# Patient Record
Sex: MALE | URBAN - METROPOLITAN AREA
[De-identification: names, ages, dates, MRNs, and addresses within clinical notes are randomized per-mention and may not be internally consistent; named-entity substitution may affect disease eponyms.]

---

## 2024-10-03 ENCOUNTER — TELEPHONE (OUTPATIENT)
Age: 89
End: 2024-10-03

## 2024-10-03 NOTE — TELEPHONE ENCOUNTER
New Patient    What is the reason for the patient’s appointment?: NP calling to schedule appt for penile implant. Pt requested to see Dr Carvajal and scheduled for next available on 11/12 @ 130pm.     What office location does the patient prefer?: [place]     Does patient have Imaging/Lab Results: no    Have patient records been requested?:  If No, are the records showing in Epic:       INSURANCE:   Do we accept the patient's insurance or is the patient Self-Pay?:    Insurance Provider: GREY ARMSTRONG   Plan Type/Number:   Member ID#: [id number]

## 2024-11-12 ENCOUNTER — OFFICE VISIT (OUTPATIENT)
Dept: UROLOGY | Facility: MEDICAL CENTER | Age: 89
End: 2024-11-12
Payer: COMMERCIAL

## 2024-11-12 VITALS
BODY MASS INDEX: 26.17 KG/M2 | WEIGHT: 193.2 LBS | SYSTOLIC BLOOD PRESSURE: 148 MMHG | HEIGHT: 72 IN | HEART RATE: 63 BPM | OXYGEN SATURATION: 97 % | DIASTOLIC BLOOD PRESSURE: 88 MMHG

## 2024-11-12 DIAGNOSIS — N52.31 ERECTILE DYSFUNCTION AFTER RADICAL PROSTATECTOMY: ICD-10-CM

## 2024-11-12 DIAGNOSIS — Z85.46 HISTORY OF PROSTATE CANCER: Primary | ICD-10-CM

## 2024-11-12 DIAGNOSIS — Z90.79 HISTORY OF RADICAL PROSTATECTOMY: ICD-10-CM

## 2024-11-12 PROCEDURE — 99203 OFFICE O/P NEW LOW 30 MIN: CPT | Performed by: UROLOGY

## 2024-11-12 RX ORDER — TADALAFIL 20 MG/1
20 TABLET ORAL DAILY PRN
Qty: 5 TABLET | Refills: 0 | Status: SHIPPED | OUTPATIENT
Start: 2024-11-12

## 2024-11-12 RX ORDER — OMEPRAZOLE 20 MG/1
20 TABLET, DELAYED RELEASE ORAL DAILY
COMMUNITY

## 2024-11-12 RX ORDER — SILDENAFIL 100 MG/1
100 TABLET, FILM COATED ORAL
COMMUNITY
Start: 2024-08-26 | End: 2024-11-12

## 2024-11-12 RX ORDER — CETIRIZINE HYDROCHLORIDE 10 MG/1
10 TABLET ORAL DAILY
COMMUNITY

## 2024-11-12 RX ORDER — DIPHENOXYLATE HYDROCHLORIDE AND ATROPINE SULFATE 2.5; .025 MG/1; MG/1
1 TABLET ORAL DAILY
COMMUNITY

## 2024-11-12 RX ORDER — NAPROXEN 500 MG/1
1000 TABLET ORAL AS NEEDED
COMMUNITY

## 2024-11-12 RX ORDER — LANOLIN ALCOHOL/MO/W.PET/CERES
CREAM (GRAM) TOPICAL DAILY
COMMUNITY

## 2024-11-12 NOTE — PROGRESS NOTES
Ambulatory Visit  Name: Curt Garcia      : 1935      MRN: [id number]  Encounter Provider: Constantine Carvajal MD  Encounter Date: 2024   Encounter department: [facility] [place]    Assessment & Plan  History of prostate cancer  1995-radical prostatectomy for prostate cancer with undetectable PSA since.  No longer getting regular PSA testing.  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  Patient will try Cialis 20 mg with prescription for 5 tablets prescribed.  If this does not work he will fill prescription for alprostadil and return for injection therapy.  Patient is interested in IPP and even though he is 89 years of age he looks wonderful has a very high performance status and 2 years ago was easily cleared by cardiology for hernia repair  Orders:    tadalafil (CIALIS) 20 MG tablet; Take 1 tablet (20 mg total) by mouth daily as needed for erectile dysfunction    Alprostadil, Vasodilator, (PROSTAGLANDIN PGE1 INJECTABLE 20 MCG/ML, STANDARD, IJ SOLN); 1 mL by Intracavernosal route once for 1 dose    History of radical prostatectomy           History of Present Illness     Curt Garcia is a 89 y.o. male who presents with a remote history of radical prostatectomy for prostate cancer.  The patient has had no evidence of disease recurrence being operated on in the [facility] by Dr. Montgomery.  The patient's erectile dysfunction has been managed with sildenafil 100 mg however at this time he does not get enough erectile rigidity for penetration and he is unable to last as well.  The patient presents for treatment interested in IPP.  The patient has an excellent performance status and otherwise is doing well healthwise.    History obtained from : patient  Review of Systems   All other systems reviewed and are negative.    Pertinent Medical History           Medical History Reviewed by  provider this encounter:  Tobacco  Allergies  Meds  Problems  Med Hx  Surg Hx  Fam Hx  Soc   Hx      Past Medical History   History reviewed. No pertinent past medical history.  Past Surgical History:   Procedure Laterality Date    INGUINAL HERNIA REPAIR      PROSTATECTOMY       Family History   Problem Relation Age of Onset    Kidney failure Mother     Emphysema Father     Colon cancer Sister     Heart failure Sister     No Known Problems Sister          in automobile accident    Alcohol abuse Brother      Current Outpatient Medications on File Prior to Visit   Medication Sig Dispense Refill    ascorbic acid (VITAMIN C) 1000 MG tablet Take 1,000 mg by mouth daily      Brinzolamide-Brimonidine 1-0.2 % SUSP Apply 1 drop to eye 2 (two) times a day      cetirizine (ZyrTEC) 10 mg tablet Take 10 mg by mouth daily      GARLIC PO Take 1 capsule by mouth daily      multivitamin (THERAGRAN) TABS Take 1 tablet by mouth daily      naproxen (EC NAPROSYN) 500 MG EC tablet Take 1,000 mg by mouth if needed for mild pain      omeprazole (PriLOSEC OTC) 20 MG tablet Take 20 mg by mouth daily      vitamin B-12 (VITAMIN B-12) 1,000 mcg tablet Take by mouth daily      [DISCONTINUED] sildenafil (VIAGRA) 100 mg tablet Take 100 mg by mouth       No current facility-administered medications on file prior to visit.   No Known Allergies   Current Outpatient Medications on File Prior to Visit   Medication Sig Dispense Refill    ascorbic acid (VITAMIN C) 1000 MG tablet Take 1,000 mg by mouth daily      Brinzolamide-Brimonidine 1-0.2 % SUSP Apply 1 drop to eye 2 (two) times a day      cetirizine (ZyrTEC) 10 mg tablet Take 10 mg by mouth daily      GARLIC PO Take 1 capsule by mouth daily      multivitamin (THERAGRAN) TABS Take 1 tablet by mouth daily      naproxen (EC NAPROSYN) 500 MG EC tablet Take 1,000 mg by mouth if needed for mild pain      omeprazole (PriLOSEC OTC) 20 MG tablet Take 20 mg by mouth daily      vitamin B-12  (VITAMIN B-12) 1,000 mcg tablet Take by mouth daily      [DISCONTINUED] sildenafil (VIAGRA) 100 mg tablet Take 100 mg by mouth       No current facility-administered medications on file prior to visit.      Social History     Tobacco Use    Smoking status: Former     Current packs/day: 0.00     Types: Cigarettes     Quit date:      Years since quittin.9    Smokeless tobacco: Never   Vaping Use    Vaping status: Never Used   Substance and Sexual Activity    Alcohol use: Yes     Alcohol/week: 2.0 standard drinks of alcohol     Types: 2 Glasses of wine per week    Drug use: Not Currently    Sexual activity: Yes         AUA SYMPTOM SCORE      Flowsheet Row Most Recent Value   AUA SYMPTOM SCORE    How often have you had a sensation of not emptying your bladder completely after you finished urinating? 0 (P)     How often have you had to urinate again less than two hours after you finished urinating? 0 (P)     How often have you found you stopped and started again several times when you urinate? 0 (P)     How often have you found it difficult to postpone urination? 0 (P)     How often have you had a weak urinary stream? 0 (P)     How often have you had to push or strain to begin urination? 0 (P)     How many times did you most typically get up to urinate from the time you went to bed at night until the time you got up in the morning? 1 (P)     Quality of Life: If you were to spend the rest of your life with your urinary condition just the way it is now, how would you feel about that? 1 (P)     AUA SYMPTOM SCORE 1 (P)            Objective     /88 (BP Location: Left arm, Patient Position: Sitting, Cuff Size: Adult)   Pulse 63   Ht 6' (1.829 m)   Wt 87.6 kg (193 lb 3.2 oz)   SpO2 97%   BMI 26.20 kg/m²   Physical Exam  Vitals reviewed.   Constitutional:       General: He is not in acute distress.     Appearance: Normal appearance. He is normal weight. He is not ill-appearing, toxic-appearing or diaphoretic.  "  HENT:      Head: Normocephalic and atraumatic.      Nose: Nose normal.      Mouth/Throat:      Mouth: Mucous membranes are moist.   Eyes:      Extraocular Movements: Extraocular movements intact.   Pulmonary:      Effort: Pulmonary effort is normal. No respiratory distress.      Breath sounds: No wheezing, rhonchi or rales.   Abdominal:      General: There is no distension.      Palpations: Abdomen is soft.   Genitourinary:     Penis: Normal.       Testes: Normal.   Musculoskeletal:         General: Normal range of motion.      Cervical back: Neck supple.   Skin:     General: Skin is dry.   Neurological:      Mental Status: He is alert and oriented to person, place, and time.   Psychiatric:         Mood and Affect: Mood normal.         Behavior: Behavior normal.         Thought Content: Thought content normal.         Judgment: Judgment normal.       Results  No results found for: \"PSA\"  Lab Results   Component Value Date    CALCIUM 9.3 06/13/2024    K 4.2 06/13/2024    CO2 26 06/13/2024    BUN 21 06/13/2024    CREATININE 1.14 06/13/2024     No results found for: \"WBC\", \"HGB\", \"HCT\", \"MCV\", \"PLT\"    Office Urine Dip  No results found for this or any previous visit (from the past 1 hour(s)).]    Administrative Statements         "

## 2024-11-12 NOTE — LETTER
2024     Osman Daniels DO  [address]  [address]  [place] [address]    Patient: Curt Garcia   YOB: 1935   Date of Visit: 2024       Dear Dr. Daniels:    Thank you for referring Curt Garcia to me for evaluation. Below are my notes for this consultation.    If you have questions, please do not hesitate to call me. I look forward to following your patient along with you.         Sincerely,        Constantine Carvajal MD        CC: No Recipients    Constantine Carvajal MD  2024  2:27 PM  Sign when Signing Visit  Ambulatory Visit  Name: Curt Garcia      : 1935      MRN: [id number]  Encounter Provider: Constantine Carvajal MD  Encounter Date: 2024   Encounter department: [facility] [place]    Assessment & Plan  History of prostate cancer  1995-radical prostatectomy for prostate cancer with undetectable PSA since.  No longer getting regular PSA testing.  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  Patient will try Cialis 20 mg with prescription for 5 tablets prescribed.  If this does not work he will fill prescription for alprostadil and return for injection therapy.  Patient is interested in IPP and even though he is 89 years of age he looks wonderful has a very high performance status and 2 years ago was easily cleared by cardiology for hernia repair  Orders:  •  tadalafil (CIALIS) 20 MG tablet; Take 1 tablet (20 mg total) by mouth daily as needed for erectile dysfunction  •  Alprostadil, Vasodilator, (PROSTAGLANDIN PGE1 INJECTABLE 20 MCG/ML, STANDARD, IJ SOLN); 1 mL by Intracavernosal route once for 1 dose    History of radical prostatectomy           History of Present Illness    Curt Garcia is a 89 y.o. male who presents with a remote history of radical prostatectomy for prostate cancer.  The patient has had no evidence of disease  recurrence being operated on in the [facility] by Dr. Montgomery.  The patient's erectile dysfunction has been managed with sildenafil 100 mg however at this time he does not get enough erectile rigidity for penetration and he is unable to last as well.  The patient presents for treatment interested in IPP.  The patient has an excellent performance status and otherwise is doing well healthwise.    History obtained from : patient  Review of Systems   All other systems reviewed and are negative.    Pertinent Medical History          Medical History Reviewed by provider this encounter:  Tobacco  Allergies  Meds  Problems  Med Hx  Surg Hx  Fam Hx  Soc   Hx      Past Medical History  History reviewed. No pertinent past medical history.  Past Surgical History:   Procedure Laterality Date   • INGUINAL HERNIA REPAIR     • PROSTATECTOMY       Family History   Problem Relation Age of Onset   • Kidney failure Mother    • Emphysema Father    • Colon cancer Sister    • Heart failure Sister    • No Known Problems Sister          in automobile accident   • Alcohol abuse Brother      Current Outpatient Medications on File Prior to Visit   Medication Sig Dispense Refill   • ascorbic acid (VITAMIN C) 1000 MG tablet Take 1,000 mg by mouth daily     • Brinzolamide-Brimonidine 1-0.2 % SUSP Apply 1 drop to eye 2 (two) times a day     • cetirizine (ZyrTEC) 10 mg tablet Take 10 mg by mouth daily     • GARLIC PO Take 1 capsule by mouth daily     • multivitamin (THERAGRAN) TABS Take 1 tablet by mouth daily     • naproxen (EC NAPROSYN) 500 MG EC tablet Take 1,000 mg by mouth if needed for mild pain     • omeprazole (PriLOSEC OTC) 20 MG tablet Take 20 mg by mouth daily     • vitamin B-12 (VITAMIN B-12) 1,000 mcg tablet Take by mouth daily     • [DISCONTINUED] sildenafil (VIAGRA) 100 mg tablet Take 100 mg by mouth       No current facility-administered medications on file prior to visit.   No Known Allergies   Current  Outpatient Medications on File Prior to Visit   Medication Sig Dispense Refill   • ascorbic acid (VITAMIN C) 1000 MG tablet Take 1,000 mg by mouth daily     • Brinzolamide-Brimonidine 1-0.2 % SUSP Apply 1 drop to eye 2 (two) times a day     • cetirizine (ZyrTEC) 10 mg tablet Take 10 mg by mouth daily     • GARLIC PO Take 1 capsule by mouth daily     • multivitamin (THERAGRAN) TABS Take 1 tablet by mouth daily     • naproxen (EC NAPROSYN) 500 MG EC tablet Take 1,000 mg by mouth if needed for mild pain     • omeprazole (PriLOSEC OTC) 20 MG tablet Take 20 mg by mouth daily     • vitamin B-12 (VITAMIN B-12) 1,000 mcg tablet Take by mouth daily     • [DISCONTINUED] sildenafil (VIAGRA) 100 mg tablet Take 100 mg by mouth       No current facility-administered medications on file prior to visit.      Social History     Tobacco Use   • Smoking status: Former     Current packs/day: 0.00     Types: Cigarettes     Quit date:      Years since quittin.9   • Smokeless tobacco: Never   Vaping Use   • Vaping status: Never Used   Substance and Sexual Activity   • Alcohol use: Yes     Alcohol/week: 2.0 standard drinks of alcohol     Types: 2 Glasses of wine per week   • Drug use: Not Currently   • Sexual activity: Yes         AUA SYMPTOM SCORE      Flowsheet Row Most Recent Value   AUA SYMPTOM SCORE    How often have you had a sensation of not emptying your bladder completely after you finished urinating? 0 (P)     How often have you had to urinate again less than two hours after you finished urinating? 0 (P)     How often have you found you stopped and started again several times when you urinate? 0 (P)     How often have you found it difficult to postpone urination? 0 (P)     How often have you had a weak urinary stream? 0 (P)     How often have you had to push or strain to begin urination? 0 (P)     How many times did you most typically get up to urinate from the time you went to bed at night until the time you got up in  "the morning? 1 (P)     Quality of Life: If you were to spend the rest of your life with your urinary condition just the way it is now, how would you feel about that? 1 (P)     AUA SYMPTOM SCORE 1 (P)            Objective    /88 (BP Location: Left arm, Patient Position: Sitting, Cuff Size: Adult)   Pulse 63   Ht 6' (1.829 m)   Wt 87.6 kg (193 lb 3.2 oz)   SpO2 97%   BMI 26.20 kg/m²   Physical Exam  Vitals reviewed.   Constitutional:       General: He is not in acute distress.     Appearance: Normal appearance. He is normal weight. He is not ill-appearing, toxic-appearing or diaphoretic.   HENT:      Head: Normocephalic and atraumatic.      Nose: Nose normal.      Mouth/Throat:      Mouth: Mucous membranes are moist.   Eyes:      Extraocular Movements: Extraocular movements intact.   Pulmonary:      Effort: Pulmonary effort is normal. No respiratory distress.      Breath sounds: No wheezing, rhonchi or rales.   Abdominal:      General: There is no distension.      Palpations: Abdomen is soft.   Genitourinary:     Penis: Normal.       Testes: Normal.   Musculoskeletal:         General: Normal range of motion.      Cervical back: Neck supple.   Skin:     General: Skin is dry.   Neurological:      Mental Status: He is alert and oriented to person, place, and time.   Psychiatric:         Mood and Affect: Mood normal.         Behavior: Behavior normal.         Thought Content: Thought content normal.         Judgment: Judgment normal.       Results  No results found for: \"PSA\"  Lab Results   Component Value Date    CALCIUM 9.3 06/13/2024    K 4.2 06/13/2024    CO2 26 06/13/2024    BUN 21 06/13/2024    CREATININE 1.14 06/13/2024     No results found for: \"WBC\", \"HGB\", \"HCT\", \"MCV\", \"PLT\"    Office Urine Dip  No results found for this or any previous visit (from the past 1 hour(s)).]    Administrative Statements        "

## 2024-11-14 ENCOUNTER — TELEPHONE (OUTPATIENT)
Dept: UROLOGY | Facility: MEDICAL CENTER | Age: 89
End: 2024-11-14

## 2024-11-14 NOTE — TELEPHONE ENCOUNTER
Pt stated he did not want Alprostadil now and did not schedule fu.  He said he will call the office.